# Patient Record
Sex: FEMALE | Race: OTHER | Employment: UNEMPLOYED | URBAN - METROPOLITAN AREA
[De-identification: names, ages, dates, MRNs, and addresses within clinical notes are randomized per-mention and may not be internally consistent; named-entity substitution may affect disease eponyms.]

---

## 2017-01-23 RX ORDER — SUCRALFATE 1 G/10ML
SUSPENSION ORAL
Qty: 420 ML | Refills: 0 | Status: SHIPPED | OUTPATIENT
Start: 2017-01-23 | End: 2017-02-16 | Stop reason: SDUPTHER

## 2017-02-16 RX ORDER — SUCRALFATE ORAL 1 G/10ML
SUSPENSION ORAL
Qty: 420 ML | Refills: 1 | Status: SHIPPED | OUTPATIENT
Start: 2017-02-16 | End: 2017-03-21 | Stop reason: SDUPTHER

## 2017-03-21 RX ORDER — SUCRALFATE ORAL 1 G/10ML
SUSPENSION ORAL
Qty: 420 ML | Refills: 0 | Status: SHIPPED | OUTPATIENT
Start: 2017-03-21 | End: 2017-03-28 | Stop reason: SDUPTHER

## 2017-03-28 RX ORDER — SUCRALFATE ORAL 1 G/10ML
SUSPENSION ORAL
Qty: 3600 ML | Refills: 0 | Status: SHIPPED | OUTPATIENT
Start: 2017-03-28 | End: 2018-10-19 | Stop reason: SDUPTHER

## 2017-03-28 RX ORDER — PANTOPRAZOLE SODIUM 40 MG/1
40 TABLET, DELAYED RELEASE ORAL 2 TIMES DAILY
Qty: 180 TABLET | Refills: 0 | Status: SHIPPED | OUTPATIENT
Start: 2017-03-28 | End: 2017-06-08 | Stop reason: SDUPTHER

## 2017-06-08 RX ORDER — PANTOPRAZOLE SODIUM 40 MG/1
TABLET, DELAYED RELEASE ORAL
Qty: 180 TABLET | Refills: 0 | Status: SHIPPED | OUTPATIENT
Start: 2017-06-08 | End: 2018-10-19 | Stop reason: SDUPTHER

## 2017-09-06 RX ORDER — PANTOPRAZOLE SODIUM 40 MG/1
TABLET, DELAYED RELEASE ORAL
Qty: 180 TABLET | Refills: 0 | OUTPATIENT
Start: 2017-09-06

## 2018-03-22 ENCOUNTER — OFFICE VISIT (OUTPATIENT)
Dept: FAMILY MEDICINE CLINIC | Age: 38
End: 2018-03-22
Payer: OTHER GOVERNMENT

## 2018-03-22 VITALS
HEART RATE: 80 BPM | SYSTOLIC BLOOD PRESSURE: 118 MMHG | WEIGHT: 122 LBS | HEIGHT: 61 IN | DIASTOLIC BLOOD PRESSURE: 82 MMHG | TEMPERATURE: 98.5 F | RESPIRATION RATE: 20 BRPM | BODY MASS INDEX: 23.03 KG/M2

## 2018-03-22 DIAGNOSIS — R53.83 MALAISE AND FATIGUE: Primary | ICD-10-CM

## 2018-03-22 DIAGNOSIS — R53.81 MALAISE AND FATIGUE: Primary | ICD-10-CM

## 2018-03-22 DIAGNOSIS — Z01.419 NORMAL GYNECOLOGIC EXAMINATION: ICD-10-CM

## 2018-03-22 DIAGNOSIS — M79.10 MYALGIA: ICD-10-CM

## 2018-03-22 LAB
INFLUENZA A ANTIBODY: NEGATIVE
INFLUENZA B ANTIBODY: NEGATIVE

## 2018-03-22 PROCEDURE — 87804 INFLUENZA ASSAY W/OPTIC: CPT | Performed by: FAMILY MEDICINE

## 2018-03-22 PROCEDURE — 99213 OFFICE O/P EST LOW 20 MIN: CPT | Performed by: FAMILY MEDICINE

## 2018-03-22 RX ORDER — AZITHROMYCIN 250 MG/1
TABLET, FILM COATED ORAL
Qty: 1 PACKET | Refills: 0 | Status: SHIPPED | OUTPATIENT
Start: 2018-03-22 | End: 2018-04-01

## 2018-03-22 RX ORDER — METHYLPREDNISOLONE 4 MG/1
TABLET ORAL
Qty: 1 KIT | Refills: 0 | Status: SHIPPED | OUTPATIENT
Start: 2018-03-22 | End: 2018-10-19 | Stop reason: ALTCHOICE

## 2018-03-22 ASSESSMENT — ENCOUNTER SYMPTOMS
SORE THROAT: 1
SINUS PRESSURE: 1
COUGH: 0
CONSTIPATION: 0
EYE DISCHARGE: 0
DIARRHEA: 0
EYE ITCHING: 0
ABDOMINAL PAIN: 0
SHORTNESS OF BREATH: 0

## 2018-03-22 ASSESSMENT — PATIENT HEALTH QUESTIONNAIRE - PHQ9
SUM OF ALL RESPONSES TO PHQ QUESTIONS 1-9: 0
SUM OF ALL RESPONSES TO PHQ9 QUESTIONS 1 & 2: 0
2. FEELING DOWN, DEPRESSED OR HOPELESS: 0
1. LITTLE INTEREST OR PLEASURE IN DOING THINGS: 0

## 2018-03-22 NOTE — PROGRESS NOTES
Subjective  Carlos Vanegas, 43 y.o. female presents today with:  Chief Complaint   Patient presents with    Cough     congestion, body aches, very tired x 3 days. Unchanged since the onset. Has been taking Theraflu- minimal relief           HPI    Section complains a sore throat cough congestion ear pain and sinus pressure and some muscle soreness. No other questions and or concerns for today's visit      Review of Systems   Constitutional: Positive for fatigue. Negative for appetite change and fever. HENT: Positive for congestion, ear pain, sinus pressure and sore throat. Eyes: Negative for discharge and itching. Respiratory: Negative for cough (productive green cough) and shortness of breath. Cardiovascular: Negative for chest pain and palpitations. Gastrointestinal: Negative for abdominal pain, constipation and diarrhea. Endocrine: Negative for polydipsia. Genitourinary: Negative for difficulty urinating. Musculoskeletal: Positive for myalgias. Negative for gait problem. Skin: Negative. Neurological: Negative for dizziness. Hematological: Negative. Psychiatric/Behavioral: Negative. Past Medical History:   Diagnosis Date    Left foot pain      Past Surgical History:   Procedure Laterality Date    UPPER GASTROINTESTINAL ENDOSCOPY  09/14/2016    DR OSORIO     Social History     Social History    Marital status:      Spouse name: N/A    Number of children: N/A    Years of education: N/A     Occupational History    Not on file.      Social History Main Topics    Smoking status: Never Smoker    Smokeless tobacco: Never Used    Alcohol use No    Drug use: No    Sexual activity: Yes     Partners: Male     Other Topics Concern    Not on file     Social History Narrative    No narrative on file     Family History   Problem Relation Age of Onset    Diabetes Mother     High Blood Pressure Mother     Heart Disease Mother     Stroke Sister     High Blood

## 2018-03-27 ENCOUNTER — TELEPHONE (OUTPATIENT)
Dept: FAMILY MEDICINE CLINIC | Age: 38
End: 2018-03-27

## 2018-03-27 RX ORDER — CEFDINIR 300 MG/1
300 CAPSULE ORAL 2 TIMES DAILY
Qty: 20 CAPSULE | Refills: 0 | Status: SHIPPED | OUTPATIENT
Start: 2018-03-27

## 2018-03-27 NOTE — TELEPHONE ENCOUNTER
Patient's  calling, she was into see you on 03/22/18 for a sinus infection. She is not getting any better. Still has a sore throat, congestion and drainage. Would like to know if there is something else you can prescribe for her. Please advise.

## 2018-05-05 DIAGNOSIS — Z00.00 ANNUAL PHYSICAL EXAM: Primary | ICD-10-CM

## 2018-05-05 DIAGNOSIS — Z00.00 ANNUAL PHYSICAL EXAM: ICD-10-CM

## 2018-05-05 LAB
ALBUMIN SERPL-MCNC: 4.5 G/DL (ref 3.9–4.9)
ALP BLD-CCNC: 68 U/L (ref 40–130)
ALT SERPL-CCNC: 14 U/L (ref 0–33)
ANION GAP SERPL CALCULATED.3IONS-SCNC: 16 MEQ/L (ref 7–13)
AST SERPL-CCNC: 16 U/L (ref 0–35)
BILIRUB SERPL-MCNC: 0.8 MG/DL (ref 0–1.2)
BUN BLDV-MCNC: 18 MG/DL (ref 6–20)
CALCIUM SERPL-MCNC: 9.2 MG/DL (ref 8.6–10.2)
CHLORIDE BLD-SCNC: 102 MEQ/L (ref 98–107)
CHOLESTEROL, TOTAL: 195 MG/DL (ref 0–199)
CO2: 21 MEQ/L (ref 22–29)
CREAT SERPL-MCNC: 0.63 MG/DL (ref 0.5–0.9)
GFR AFRICAN AMERICAN: >60
GFR NON-AFRICAN AMERICAN: >60
GLOBULIN: 3 G/DL (ref 2.3–3.5)
GLUCOSE BLD-MCNC: 79 MG/DL (ref 74–109)
HCT VFR BLD CALC: 42.4 % (ref 37–47)
HDLC SERPL-MCNC: 71 MG/DL (ref 40–59)
HEMOGLOBIN: 14.3 G/DL (ref 12–16)
LDL CHOLESTEROL CALCULATED: 111 MG/DL (ref 0–129)
MCH RBC QN AUTO: 33.8 PG (ref 27–31.3)
MCHC RBC AUTO-ENTMCNC: 33.6 % (ref 33–37)
MCV RBC AUTO: 100.3 FL (ref 82–100)
PDW BLD-RTO: 13.9 % (ref 11.5–14.5)
PLATELET # BLD: 300 K/UL (ref 130–400)
POTASSIUM SERPL-SCNC: 4.5 MEQ/L (ref 3.5–5.1)
RBC # BLD: 4.23 M/UL (ref 4.2–5.4)
SODIUM BLD-SCNC: 139 MEQ/L (ref 132–144)
TOTAL PROTEIN: 7.5 G/DL (ref 6.4–8.1)
TRIGL SERPL-MCNC: 67 MG/DL (ref 0–200)
WBC # BLD: 6.3 K/UL (ref 4.8–10.8)

## 2018-07-13 ENCOUNTER — TELEPHONE (OUTPATIENT)
Dept: FAMILY MEDICINE CLINIC | Age: 38
End: 2018-07-13

## 2018-07-13 NOTE — TELEPHONE ENCOUNTER
Received [facility], for dicyclomine 10 mg,Rx is not on med list,  last seen 03/2018 for cough. Last seen PCP 08/2016. Needs to schedule appt to discuss.

## 2018-10-19 ENCOUNTER — OFFICE VISIT (OUTPATIENT)
Dept: FAMILY MEDICINE CLINIC | Age: 38
End: 2018-10-19
Payer: OTHER GOVERNMENT

## 2018-10-19 VITALS
RESPIRATION RATE: 18 BRPM | BODY MASS INDEX: 22.56 KG/M2 | HEART RATE: 76 BPM | WEIGHT: 122.6 LBS | TEMPERATURE: 97.6 F | SYSTOLIC BLOOD PRESSURE: 120 MMHG | HEIGHT: 62 IN | DIASTOLIC BLOOD PRESSURE: 70 MMHG

## 2018-10-19 DIAGNOSIS — J01.90 ACUTE BACTERIAL SINUSITIS: ICD-10-CM

## 2018-10-19 DIAGNOSIS — J02.9 SORE THROAT: Primary | ICD-10-CM

## 2018-10-19 DIAGNOSIS — B96.89 ACUTE BACTERIAL SINUSITIS: ICD-10-CM

## 2018-10-19 LAB — S PYO AG THROAT QL: NORMAL

## 2018-10-19 PROCEDURE — 99213 OFFICE O/P EST LOW 20 MIN: CPT | Performed by: FAMILY MEDICINE

## 2018-10-19 PROCEDURE — 87880 STREP A ASSAY W/OPTIC: CPT | Performed by: FAMILY MEDICINE

## 2018-10-19 RX ORDER — AZITHROMYCIN 250 MG/1
TABLET, FILM COATED ORAL
Qty: 1 PACKET | Refills: 0 | Status: SHIPPED | OUTPATIENT
Start: 2018-10-19 | End: 2018-10-29

## 2018-10-19 RX ORDER — SUCRALFATE ORAL 1 G/10ML
SUSPENSION ORAL
Qty: 3600 ML | Refills: 0 | Status: SHIPPED | OUTPATIENT
Start: 2018-10-19 | End: 2018-11-12 | Stop reason: SDUPTHER

## 2018-10-19 RX ORDER — PANTOPRAZOLE SODIUM 40 MG/1
TABLET, DELAYED RELEASE ORAL
Qty: 180 TABLET | Refills: 0 | Status: SHIPPED | OUTPATIENT
Start: 2018-10-19

## 2018-10-19 ASSESSMENT — ENCOUNTER SYMPTOMS
ABDOMINAL PAIN: 0
CONSTIPATION: 0
EYE DISCHARGE: 0
SORE THROAT: 1
DIARRHEA: 0
SINUS PRESSURE: 0
SHORTNESS OF BREATH: 0
CHEST TIGHTNESS: 0
EYE ITCHING: 0

## 2018-10-19 NOTE — PROGRESS NOTES
Subjective  Carlos Vanegas, 43 y.o. female presents today with:  Chief Complaint   Patient presents with    Pharyngitis     Ongoing x 2 days. Pt states she has been running a fever also between 102-101. Pt presents afebrile today. States tonsils feel sore and are worse at night. No OTC used           HPI    Patient complaints of sore throat or difficulty swallowing sinus pressure pain productive cough    No other questions and or concerns for today's visit      Review of Systems   Constitutional: Negative for appetite change, fatigue and fever. HENT: Positive for sore throat. Negative for congestion, ear pain, sinus pressure and sneezing. Eyes: Negative for discharge and itching. Respiratory: Negative for chest tightness and shortness of breath. Cardiovascular: Negative for chest pain and palpitations. Gastrointestinal: Negative for abdominal pain, constipation and diarrhea. Endocrine: Negative for polydipsia. Genitourinary: Negative for difficulty urinating. Musculoskeletal: Negative for gait problem. Skin: Negative. Neurological: Positive for headaches. Negative for dizziness and light-headedness. Hematological: Negative. Psychiatric/Behavioral: Negative. Negative for sleep disturbance. The patient is not nervous/anxious. Past Medical History:   Diagnosis Date    Left foot pain      Past Surgical History:   Procedure Laterality Date    UPPER GASTROINTESTINAL ENDOSCOPY  09/14/2016    DR OSORIO     Social History     Social History    Marital status:      Spouse name: N/A    Number of children: N/A    Years of education: N/A     Occupational History    Not on file.      Social History Main Topics    Smoking status: Never Smoker    Smokeless tobacco: Never Used    Alcohol use No    Drug use: No    Sexual activity: Yes     Partners: Male     Other Topics Concern    Not on file     Social History Narrative    No narrative on file     Family History

## 2018-11-12 RX ORDER — SUCRALFATE ORAL 1 G/10ML
SUSPENSION ORAL
Qty: 3600 ML | Refills: 0 | Status: SHIPPED | OUTPATIENT
Start: 2018-11-12 | End: 2018-11-19 | Stop reason: SDUPTHER

## 2018-11-19 RX ORDER — SUCRALFATE ORAL 1 G/10ML
SUSPENSION ORAL
Qty: 3600 ML | Refills: 0 | Status: SHIPPED | OUTPATIENT
Start: 2018-11-19

## 2019-06-12 ENCOUNTER — TELEPHONE (OUTPATIENT)
Dept: ADMINISTRATIVE | Age: 39
End: 2019-06-12